# Patient Record
Sex: FEMALE | Race: ASIAN
[De-identification: names, ages, dates, MRNs, and addresses within clinical notes are randomized per-mention and may not be internally consistent; named-entity substitution may affect disease eponyms.]

---

## 2018-07-19 ENCOUNTER — HOSPITAL ENCOUNTER (EMERGENCY)
Dept: HOSPITAL 25 - UCEAST | Age: 43
Discharge: ANOTHER HOSPITAL | End: 2018-07-19
Payer: COMMERCIAL

## 2018-07-19 ENCOUNTER — HOSPITAL ENCOUNTER (INPATIENT)
Dept: HOSPITAL 25 - CHICATH | Age: 43
LOS: 1 days | Discharge: HOME | DRG: 192 | End: 2018-07-20
Attending: INTERNAL MEDICINE | Admitting: INTERNAL MEDICINE
Payer: COMMERCIAL

## 2018-07-19 VITALS — DIASTOLIC BLOOD PRESSURE: 56 MMHG | SYSTOLIC BLOOD PRESSURE: 97 MMHG

## 2018-07-19 DIAGNOSIS — Z79.82: ICD-10-CM

## 2018-07-19 DIAGNOSIS — F32.9: ICD-10-CM

## 2018-07-19 DIAGNOSIS — Z83.3: ICD-10-CM

## 2018-07-19 DIAGNOSIS — F41.9: ICD-10-CM

## 2018-07-19 DIAGNOSIS — I95.9: ICD-10-CM

## 2018-07-19 DIAGNOSIS — R00.0: ICD-10-CM

## 2018-07-19 DIAGNOSIS — M50.321: ICD-10-CM

## 2018-07-19 DIAGNOSIS — Z79.51: ICD-10-CM

## 2018-07-19 DIAGNOSIS — I21.3: Primary | ICD-10-CM

## 2018-07-19 DIAGNOSIS — Z72.89: ICD-10-CM

## 2018-07-19 DIAGNOSIS — I20.1: Primary | ICD-10-CM

## 2018-07-19 DIAGNOSIS — E78.5: ICD-10-CM

## 2018-07-19 DIAGNOSIS — R94.31: ICD-10-CM

## 2018-07-19 DIAGNOSIS — J45.909: ICD-10-CM

## 2018-07-19 DIAGNOSIS — R42: ICD-10-CM

## 2018-07-19 DIAGNOSIS — M19.90: ICD-10-CM

## 2018-07-19 DIAGNOSIS — Z82.49: ICD-10-CM

## 2018-07-19 LAB
AST SERPL-CCNC: 16 U/L (ref 13–39)
BASOPHILS # BLD AUTO: 0.1 10^3/UL (ref 0–0.2)
EOSINOPHIL # BLD AUTO: 0.1 10^3/UL (ref 0–0.6)
HCT VFR BLD AUTO: 37 % (ref 35–47)
HCT VFR BLD AUTO: 45 % (ref 35–47)
HGB BLD-MCNC: 12.7 G/DL (ref 12–16)
HGB BLD-MCNC: 15.1 G/DL (ref 12–16)
INR PPP/BLD: 0.9 (ref 0.77–1.02)
INR PPP/BLD: 0.9 (ref 0.77–1.02)
LYMPHOCYTES # BLD AUTO: 1.8 10^3/UL (ref 1–4.8)
MCH RBC QN AUTO: 30 PG (ref 27–31)
MCH RBC QN AUTO: 30 PG (ref 27–31)
MCHC RBC AUTO-ENTMCNC: 34 G/DL (ref 31–36)
MCHC RBC AUTO-ENTMCNC: 34 G/DL (ref 31–36)
MCV RBC AUTO: 87 FL (ref 80–97)
MCV RBC AUTO: 87 FL (ref 80–97)
MONOCYTES # BLD AUTO: 0.7 10^3/UL (ref 0–0.8)
NEUTROPHILS # BLD AUTO: 9.5 10^3/UL (ref 1.5–7.7)
NRBC # BLD AUTO: 0 10^3/UL
NRBC BLD QL AUTO: 0
PLATELET # BLD AUTO: 228 10^3/UL (ref 150–450)
PLATELET # BLD AUTO: 231 10^3/UL (ref 150–450)
RBC # BLD AUTO: 4.25 10^6/UL (ref 4–5.4)
RBC # BLD AUTO: 5.12 10^6/UL (ref 4–5.4)
WBC # BLD AUTO: 10 10^3/UL (ref 3.5–10.8)
WBC # BLD AUTO: 12.1 10^3/UL (ref 3.5–10.8)

## 2018-07-19 PROCEDURE — 85610 PROTHROMBIN TIME: CPT

## 2018-07-19 PROCEDURE — B2111ZZ FLUOROSCOPY OF MULTIPLE CORONARY ARTERIES USING LOW OSMOLAR CONTRAST: ICD-10-PCS | Performed by: INTERNAL MEDICINE

## 2018-07-19 PROCEDURE — G0463 HOSPITAL OUTPT CLINIC VISIT: HCPCS

## 2018-07-19 PROCEDURE — 82553 CREATINE MB FRACTION: CPT

## 2018-07-19 PROCEDURE — 83605 ASSAY OF LACTIC ACID: CPT

## 2018-07-19 PROCEDURE — 85025 COMPLETE CBC W/AUTO DIFF WBC: CPT

## 2018-07-19 PROCEDURE — 99204 OFFICE O/P NEW MOD 45 MIN: CPT

## 2018-07-19 PROCEDURE — 85730 THROMBOPLASTIN TIME PARTIAL: CPT

## 2018-07-19 PROCEDURE — 83874 ASSAY OF MYOGLOBIN: CPT

## 2018-07-19 PROCEDURE — B2151ZZ FLUOROSCOPY OF LEFT HEART USING LOW OSMOLAR CONTRAST: ICD-10-PCS | Performed by: INTERNAL MEDICINE

## 2018-07-19 PROCEDURE — 93005 ELECTROCARDIOGRAM TRACING: CPT

## 2018-07-19 PROCEDURE — 84484 ASSAY OF TROPONIN QUANT: CPT

## 2018-07-19 PROCEDURE — 99285 EMERGENCY DEPT VISIT HI MDM: CPT

## 2018-07-19 PROCEDURE — 87641 MR-STAPH DNA AMP PROBE: CPT

## 2018-07-19 PROCEDURE — C1887 CATHETER, GUIDING: HCPCS

## 2018-07-19 PROCEDURE — 80053 COMPREHEN METABOLIC PANEL: CPT

## 2018-07-19 PROCEDURE — 80048 BASIC METABOLIC PNL TOTAL CA: CPT

## 2018-07-19 PROCEDURE — 4A023N7 MEASUREMENT OF CARDIAC SAMPLING AND PRESSURE, LEFT HEART, PERCUTANEOUS APPROACH: ICD-10-PCS | Performed by: INTERNAL MEDICINE

## 2018-07-19 PROCEDURE — 85347 COAGULATION TIME ACTIVATED: CPT

## 2018-07-19 PROCEDURE — 83721 ASSAY OF BLOOD LIPOPROTEIN: CPT

## 2018-07-19 PROCEDURE — 80061 LIPID PANEL: CPT

## 2018-07-19 PROCEDURE — 83880 ASSAY OF NATRIURETIC PEPTIDE: CPT

## 2018-07-19 PROCEDURE — 94640 AIRWAY INHALATION TREATMENT: CPT

## 2018-07-19 PROCEDURE — 85027 COMPLETE CBC AUTOMATED: CPT

## 2018-07-19 PROCEDURE — 82550 ASSAY OF CK (CPK): CPT

## 2018-07-19 PROCEDURE — 93306 TTE W/DOPPLER COMPLETE: CPT

## 2018-07-19 PROCEDURE — 36415 COLL VENOUS BLD VENIPUNCTURE: CPT

## 2018-07-19 RX ADMIN — ASPIRIN SCH MG: 81 TABLET, CHEWABLE ORAL at 12:00

## 2018-07-19 NOTE — HP
CC:  Dr. Fozia Penn *

 

ADMISSION HISTORY AND PHYSICAL:

 

DATE OF ADMISSION:  18

 

CHIEF COMPLAINT:  The patient with near syncopal sensation with mild neck 
discomfort and question of slight nauseousness with abnormal EKG raising 
question of acute ST-segment elevation inferior wall myocardial infarction.

 

HISTORY OF PRESENT ILLNESS:  The patient is a 42-year-old female with no 
history of prior cardiac problems other than a history of palpitation several 
years ago that was not found to have any significant abnormalities.  I do not 
have that workup at the time of this dictation, but we will be reviewing that.  
Otherwise, she denies any history of myocardial infarction, congestive heart 
failure, significant heart rhythm disturbance.  Today, she got up and went for 
a ride on her bicycle not doing overly exertional type activity and developed 
the onset of dizziness with a discomfort that she now states within the back of 
the neck.  She stopped because she was not sure if she was going to pass out, 
but thought that she was getting close to passing out.  She sat down, felt a 
little bit nauseous as well.  She denied any profound shortness of breath with 
it.  She called her significant other and they went to [facility] and an 
EKG was obtained that questioned J point elevation in the inferior leads with T 
wave inversion in AVL.  As such, she was transferred over to the main center at 
[facility] as a STEMI alert.  In the emergency room at [facility], she was not having any specific chest or throat discomfort.  En 
route, the ambulance stated that her blood pressure was 56 and they initially 
could not get a line in her.  They eventually got a line in and they gave her 
fluid.  On arrival in the emergency room, her blood pressure was in the 125 
range.

 

The patient had cardiac risk factors include negative history of diabetes. 
Currently, she had gestational diabetes.  No history of hypertension.  She has 
a history of increased cholesterol.  She does not smoke and she has a family 
history of both her father and her mother  of according to her of 
myocardial infarction young in life, father with 61 and mother with 59.  Her 
brother has high cholesterol.

 

Of note, the patient does mention that with riding her bike today, the bike was 
adjusted and she believes she had to bend her neck up a little more.  She also 
noticed some numbness in her hands that before she had attributed to her C4 and 
C5 compression fracture.

 

PAST MEDICAL HISTORY:  The patient states that she has a C4-C5 compression 
fracture that is fairly significant.

 

PAST SURGICAL HISTORY:  Includes fibroid surgery with a myomectomy.

 

ALLERGIES:  She has no known drug allergies.

 

REVIEW OF SYSTEMS:  Pertinent to proceeding to the cardiovascular laboratory.  
She denies any history of stroke, TIA.  She denies any history of hematochezia, 
hematemesis, or hematuria.  She has no significant history of renal dysfunction 
or contrast allergy.

 

                               PHYSICAL EXAMINATION

 

GENERAL:  When I see reveals a pleasant female appearing nervous but in no 
acute distress.

 

VITAL SIGNS:  Blood pressure 125/90, pulse was 79 and regular.

 

HEENT:  Conjunctivae pink.  Sclerae clear.

 

NECK:  Supple with no increased JVP.  Carotid had good upstroke and volume 
without bruits.

 

LUNGS:  Reveal no accessory muscle usage.  There is good excursion.  Lungs were 
clear to A and P.

 

HEART:  Revealed no visible heaves, no palpable heaves or thrills.  Normal S1, 
S2. No S3, S4, gallop.  No significant systolic or diastolic murmur appreciated.

 

ABDOMEN:  Soft, nontender without organomegaly.

 

EXTREMITIES:  Without clubbing, cyanosis, or eve pitting edema.  Peripheral 
pulses were intact.  There was no bruit in the right groin area.

 

NEUROLOGIC:  The patient is alert and oriented with normal mentation.

 

MUSCULOSKELETAL:  The patient moves all extremities appropriately.

 

PSYCHOLOGICAL:  The patient with normal affect.

 

 DIAGNOSTIC STUDIES/LAB DATA:  A limited echocardiogram showed overall normal 
left ventricular contractility with a question of perhaps at most proximal 
portion of the inferior wall in only one review being hypokinetic and perhaps a 
small area of the low posterior lateral wall in only minimal views.  In general
, the overall EF was normal.

 

Repeat EKG was performed and seemed to show less J point elevation.

 

Labs were not sent as no blood had been drawn yet.

 

ASSESSMENT AND PLAN:  Overall assessment, Janny presents now with a dizzy, 
lightheaded sensation with neck discomfort with mild nauseous sensation with an 
abnormal EKG.  Her limited echocardiogram is very subtle in nature.  In general
, comparing this EKG to a prior older EKG, the J point elevation is more 
prominent currently.  Consideration, we discussed at length the issue about 
proceeding with a diagnostic cardiac catheterization to definitively rule out 
the presence of significant underlying coronary artery disease given her strong 
family history and hyperlipidemia and her presentation.  She understood this 
and wished to proceed to get a definitive answer.  The risks and benefits were 
explained to both her and her significant other and she understood them and 
wished to proceed.  The patient received Brilinta therapy as well as a heparin 
bolus of 4000 units and a full aspirin.  Further management will be made 
pending the results of the cardiac catheterization.  Blood tests will be 
obtained at the time of the catheterization.

 

[id number]/[facility] #: [id number]

MTDD

## 2018-07-19 NOTE — UC
Dizzy HPI


HPI Summary: 


I, Holly Markham, scribed for attending Paulette Branch MD. 





Pt is a 43 y/o F who presents to [facility] c/o sudden onset dizziness. Five miles 

into her bike ride this morning she started to feel like crap and felt like 

I was going to black out. Once symptoms began, she got off the bike and laid 

down. Reports that she felt dizzy and is unable to describe the sensation. She 

is unsure if dizziness is changed by closing her eyes. States that at onset her 

neck also became stiff. Additionally c/o slight nausea and numbness/tingling in 

the bilateral hands. Negative CP, sob, abd pain. No nausea,  no diaphoresis, no 

back pain.  Reports that she had a handful of cereal this morning prior to her 

bike ride which is typical and that 5 miles is routine and not 

uncharacteristically far for her. She had not just biked up a hill prior to 

onset of symptoms and she has not had any prior similar episodes. Had a stress 

test many years ago. Pt is on simvastatin.  No anticoagulants.  upon 

arrival. PMHx HLD, depression, seasonal allergies. Negative PMHx HTN, DM, MI, 

CVA. 





- History Of Current Complaint


Stated Complaint: DIZZY


Time Seen by Provider: 07/19/18 07:15


Hx Obtained From: Patient


Hx Last Menstrual Period: 20 days ago.


Onset/Duration: Sudden Onset


Character: Unable To Describe - "like I was going to black out"


Aggravating Factor(s): Nothing


Alleviating Factor(s): Nothing


Associated Signs And Symptoms: Positive: Nausea





- Allergies/Home Medications


Allergies/Adverse Reactions: 


 Allergies











Allergy/AdvReac Type Severity Reaction Status Date / Time


 


No Known Allergies Allergy   Verified 06/19/17 09:26














PMH/Surg Hx/FS Hx/Imm Hx





- Additional Past Medical History


Additional PMH: 


NEGATIVE PMHx: MI, CVA, TIA, DM


Endocrine History: Dyslipidemia


Respiratory History: Other


Other Respiratory History: Seasonal allergies


Psychological History: Depression


Other History Of: 


   Negative For: HIV, Hepatitis B, Hepatitis C, Anticoagulant Therapy





- Surgical History


Surgical History: Yes


Surgery Procedure, Year, and Place: fibroid myomectomy; wisdom teeth





- Family History


Known Family History: Positive: Cardiac Disease, Hypertension, Diabetes





- Social History


Alcohol Use: Occasionally


Alcohol Amount: 1/week


Substance Use Type: None


Smoking Status (MU): Never Smoked Tobacco


Have You Smoked in the Last Year: No





Review of Systems


Constitutional: Negative


Skin: Negative


Eyes: Negative


ENT: Negative


Respiratory: Negative


Cardiovascular: Negative


Gastrointestinal: Nausea


Genitourinary: Negative


Motor: Negative


Neurovascular: Negative


Musculoskeletal: Other: - Neck stiffness


Neurological: Paresthesia - Bilateral hands, Other - Dizziness


Psychological: Negative


All Other Systems Reviewed And Are Negative: Yes





- Comments


Additional Review of Systems Comments: 


NEGATIVE: CP





Physical Exam





- Summary


Physical Exam Summary: 





Vital Signs Reviewed: Yes


A+Ox3, 


Eyes: Conjunctiva Clear, ROBIN. EOM intact and full


ENT: Hearing grossly normal  TM x 2 clear, mmoist, uvula midline, no exudate, 

no erythema


Neck: Positive: Supple


Respiratory: Positive: No respiratory distress, No accessory muscle use + CTA 

throughout  no w/r


Cardiovascular: RRR  nl s1, s2  no m/r  CBT <2  sec


abd soft + BS nt/nd  no guarding, no distension


Musculoskeletal Exam: MANN spontanesously


Neurological: Positive: Alert,  + sensation throughout.  Pt with  adrenaline 

tremors


Psychological: Positive: Normal Response To Family, mild anxious, wife at 

bedside


Skin: Positive: no rash, no ecchymosis


Triage Information Reviewed: Yes





Diagnostics





- EKG


EKG Comments: 


Done at 0711


ST elevations in the inferior leads with reciprocal T wave changes in aVL


Cardiac Rate: NL - 69 bpm


Cardiac Rhythm: Sinus: Normal





Dizzy Course/Dx





- Course


Course Of Treatment: Patient medications reviewed this visit. Critical care 

time 30 minutes.  Pt presents feeling lightheadedness while biking today. Pt 

denies chest pain, shortness of breath, diaphoresis. h/x hld and depression.  

Pt developed tremors in ext x 4 at  - suspect related to adrenaline.  Pt with 

acute ST changes on EKG.  [place] called, STEMI alert .  Pt given 

ASA.  IV placed.  .  1 liter.  pt's wife at bedside - updated and aware.  

pt's wife requested I contact Dr Amador - personal friend - Dr. Vann notified 

as Dr. Amador not on call and office not open





- Differential Dx/Diagnosis


Provider Diagnoses: STEMI.  dizziness





- Physician Notifications


Discussed Patient Care With: 07 - Dr. Cox in ED; STEMI alert at 





Discharge





- Sign-Out/Discharge


Documenting (check all that apply): Patient Departure - Transfer to [facility] ED





- Discharge Plan


Condition: Good


Disposition: TRANS HIGHER LVL OF CARE FAC





- Billing Disposition and Condition


Condition: GOOD


Disposition: Trans Higher Lvl of Care Fac

## 2018-07-20 VITALS — SYSTOLIC BLOOD PRESSURE: 132 MMHG | DIASTOLIC BLOOD PRESSURE: 81 MMHG

## 2018-07-20 LAB
BASOPHILS # BLD AUTO: 0 10^3/UL (ref 0–0.2)
EOSINOPHIL # BLD AUTO: 0.1 10^3/UL (ref 0–0.6)
HCT VFR BLD AUTO: 39 % (ref 35–47)
HGB BLD-MCNC: 13.1 G/DL (ref 12–16)
LYMPHOCYTES # BLD AUTO: 1.8 10^3/UL (ref 1–4.8)
MCH RBC QN AUTO: 29 PG (ref 27–31)
MCHC RBC AUTO-ENTMCNC: 34 G/DL (ref 31–36)
MCV RBC AUTO: 87 FL (ref 80–97)
MONOCYTES # BLD AUTO: 0.6 10^3/UL (ref 0–0.8)
NEUTROPHILS # BLD AUTO: 5.7 10^3/UL (ref 1.5–7.7)
NRBC # BLD AUTO: 0 10^3/UL
NRBC BLD QL AUTO: 0
PLATELET # BLD AUTO: 217 10^3/UL (ref 150–450)
RBC # BLD AUTO: 4.48 10^6/UL (ref 4–5.4)
WBC # BLD AUTO: 8.2 10^3/UL (ref 3.5–10.8)

## 2018-07-20 RX ADMIN — ASPIRIN SCH MG: 81 TABLET, CHEWABLE ORAL at 09:55

## 2018-07-20 NOTE — ECHO
Patient:      TERESA ALMANZA

Med Rec#:     [id number]            :          1975          

Date:         2018            Age:          42y                 

Account#:     [id number]          Height:       162.6 cm / 64.0 in

Accession#:   [id number]           Weight:       78.02 kg / 172.0 lbs

Sex:          F                     BSA:          1.8

Room#:        ICU 2                 

Admit Date#:  2018          

Type:         Inpatient

 

Referring:    Rancho Sena MD

Reading:      Rancho Sena MD

Sonographer:  Nesha Daniels RN RDCS

CC:           JESUS ROBERTS

______________________________________________________________________

 

Transthoracic Echocardiogram

 

Indication:

Abnormal EKG

BP:           131/83

HR:           65

Rhythm:       NSR with PVCs

 

Findings     

History:

HLD 

 

Technical Comments:

The study quality is fair.  The study is technically limited due to

patient body habitus.  

 

Left Ventricle:

The left ventricular chamber size is normal. Mild concentric left

ventricular hypertrophy is observed. Global left ventricular wall motion

and contractility are within normal limits. There is normal left

ventricular systolic function. The estimated ejection fraction is

55-60%.  Normal left ventricular diastolic filling is observed. 

 

Left Atrium:

The left atrial chamber size is normal. 

 

Right Ventricle:

The right ventricular cavity size is normal. The right ventricular

global systolic function is normal. 

 

Right Atrium:

The right atrial cavity size is normal. 

 

Aortic Valve:

The aortic valve is trileaflet. The aortic valve leaflets are mildly

thickened. There is no evidence of aortic regurgitation. There is no

evidence of aortic stenosis. 

 

Mitral Valve:

The mitral valve leaflets are mildly thickened. There is a trace of

mitral regurgitation. There is no evidence of mitral stenosis. 

 

Tricuspid Valve:

The tricuspid valve leaflets are normal.  There is trace tricuspid

regurgitation.  Unable to estimate the right ventricular systolic

pressure.   There is no tricuspid stenosis. 

 

Pulmonic Valve:

The pulmonic valve appears normal. There is mild pulmonic regurgitation.

 There is no pulmonic stenosis.  

 

Pericardium:

There is no significant pericardial effusion. A pericardial fat pad is

visualized. 

 

Aorta:

There is no dilatation of the ascending aorta. There is no dilatation of

the aortic arch. There is no dilation of the aortic root. 

 

Pulmonary Artery:

The main pulmonary artery appears normal. 

 

Venous:

The inferior vena cava appears normal in size. There is less than 50%

respiratory change in the inferior vena cava dimension. 

 

Conclusions

Mild concentric left ventricular hypertrophy is observed.

Global left ventricular wall motion and contractility are within normal

limits.

The estimated ejection fraction is 55-60%. 

Normal left ventricular diastolic filling is observed.

No significant valvular disease:

There is a trace of mitral regurgitation.

There is trace tricuspid regurgitation. 

There is mild pulmonic regurgitation. 

No reports of prior studies are ffered for comparison.

 

Measurements     

Name                    Value         Normal Range            

RVIDd (AP) 2D           2.7 cm        (0.9 - 2.6)             

RVDdMajor (2D)          3.3 cm        (2.2 - 4.4)             

RAd ISD 4CH             4.4 cm        (3.4 - 4.9)             

RA (A4C)W               3.1 cm        (2.9 - 4.6)             

IVSd (2D)               1.1 cm        (0.6 - 1)               

LVPWd (2D)              1.1 cm        (0.6 - 1)               

LVIDd (2D)              4.3 cm        (3.6 - 5.4)             

LVIDs (2D)              2.7 cm        -                        

LV FS (2D)              37 %          (25 - 45)               

Aortic Annulus          1.9 cm        (1.4 - 2.6)             

Ao root diameter (2D)   2.9 cm        (2.1 - 3.5)             

Ascending Ao            2.7 cm        (2.1 - 3.4)             

Aortic arch             2.3 cm        (1.8 - 3.4)             

LA dimension (AP) 2D    3.5 cm        (2.3 - 3.8)             

LAd ISD 4CH             5.1 cm        (2.9 - 5.3)             

LA ISD 4CH W            3.5 cm        (2.5 - 4.5)             

 

Name                    Value         Normal Range            

LA ESV SP 4CH (A/L)     43 ml         -                        

LA ESV SP 2CH (A/L)     34 ml         -                        

LA ESV BP (A/L)         40 ml         -                        

LA ESV BP (A/L) index   21.8 ml/m2    -                        

LA ESV SP 4CH (MOD)     39 ml         -                        

LA ESV SP 2CH (MOD)     32 ml         -                        

 

Name                    Value         Normal Range            

MV E-wave Vmax          0.89 m/sec    -                        

MV deceleration time    139 msec      -                        

MV A-wave Vmax          0.63 m/sec    -                        

MV E:A ratio            1.4 ratio     -                        

LV septal e' Vmax       0.1 m/sec     -                        

LV lateral e' Vmax      0.13 m/sec    -                        

LV E:e' septal ratio    8.9 ratio     -                        

LV E:e' lateral ratio   6.8 ratio     -                        

 

Name                    Value         Normal Range            

AV Vmax                 1.4 m/sec     -                        

AV VTI                  30 cm         -                        

AV peak gradient        8 mmHg        -                        

AV mean gradient        4.8 mmHg      -                        

LVOT Vmax               1.3 m/sec     -                        

LVOT VTI                30 cm         -                        

LVOT peak gradient      6.5 mmHg      -                        

LVOT mean gradient      4.2 mmHg      -                        

ODALYS Vmax                0.87 m/sec    -                        

 

Name                    Value         Normal Range            

IVC diameter            1.9 cm        -                        

 

Name                    Value         Normal Range            

PV Vmax                 0.95 m/sec    -                        

 

Electronically signed by: Rancho Sena MD on 2018 10:11:22

## 2018-07-22 ENCOUNTER — HOSPITAL ENCOUNTER (OUTPATIENT)
Dept: HOSPITAL 25 - ED | Age: 43
Setting detail: OBSERVATION
LOS: 1 days | Discharge: HOME | End: 2018-07-23
Attending: INTERNAL MEDICINE | Admitting: HOSPITALIST
Payer: COMMERCIAL

## 2018-07-22 DIAGNOSIS — R06.02: ICD-10-CM

## 2018-07-22 DIAGNOSIS — Z79.82: ICD-10-CM

## 2018-07-22 DIAGNOSIS — M54.12: ICD-10-CM

## 2018-07-22 DIAGNOSIS — M47.12: ICD-10-CM

## 2018-07-22 DIAGNOSIS — R51: ICD-10-CM

## 2018-07-22 DIAGNOSIS — M50.30: Primary | ICD-10-CM

## 2018-07-22 DIAGNOSIS — R20.0: ICD-10-CM

## 2018-07-22 DIAGNOSIS — R42: ICD-10-CM

## 2018-07-22 LAB
BASOPHILS # BLD AUTO: 0 10^3/UL (ref 0–0.2)
EOSINOPHIL # BLD AUTO: 0.1 10^3/UL (ref 0–0.6)
HCT VFR BLD AUTO: 42 % (ref 35–47)
HGB BLD-MCNC: 14.2 G/DL (ref 12–16)
INR PPP/BLD: 0.84 (ref 0.77–1.02)
LYMPHOCYTES # BLD AUTO: 1.2 10^3/UL (ref 1–4.8)
MCH RBC QN AUTO: 30 PG (ref 27–31)
MCHC RBC AUTO-ENTMCNC: 34 G/DL (ref 31–36)
MCV RBC AUTO: 87 FL (ref 80–97)
MONOCYTES # BLD AUTO: 0.5 10^3/UL (ref 0–0.8)
NEUTROPHILS # BLD AUTO: 3.9 10^3/UL (ref 1.5–7.7)
NRBC # BLD AUTO: 0 10^3/UL
NRBC BLD QL AUTO: 0.1
PLATELET # BLD AUTO: 230 10^3/UL (ref 150–450)
RBC # BLD AUTO: 4.81 10^6/UL (ref 4–5.4)
RBC UR QL AUTO: (no result)
WBC # BLD AUTO: 5.7 10^3/UL (ref 3.5–10.8)
WBC UR QL AUTO: (no result)

## 2018-07-22 PROCEDURE — 70450 CT HEAD/BRAIN W/O DYE: CPT

## 2018-07-22 PROCEDURE — 81003 URINALYSIS AUTO W/O SCOPE: CPT

## 2018-07-22 PROCEDURE — 84484 ASSAY OF TROPONIN QUANT: CPT

## 2018-07-22 PROCEDURE — 72125 CT NECK SPINE W/O DYE: CPT

## 2018-07-22 PROCEDURE — 87086 URINE CULTURE/COLONY COUNT: CPT

## 2018-07-22 PROCEDURE — 80053 COMPREHEN METABOLIC PANEL: CPT

## 2018-07-22 PROCEDURE — 85025 COMPLETE CBC W/AUTO DIFF WBC: CPT

## 2018-07-22 PROCEDURE — 85730 THROMBOPLASTIN TIME PARTIAL: CPT

## 2018-07-22 PROCEDURE — 83605 ASSAY OF LACTIC ACID: CPT

## 2018-07-22 PROCEDURE — 81015 MICROSCOPIC EXAM OF URINE: CPT

## 2018-07-22 PROCEDURE — 85610 PROTHROMBIN TIME: CPT

## 2018-07-22 PROCEDURE — 96374 THER/PROPH/DIAG INJ IV PUSH: CPT

## 2018-07-22 PROCEDURE — 72141 MRI NECK SPINE W/O DYE: CPT

## 2018-07-22 PROCEDURE — 93005 ELECTROCARDIOGRAM TRACING: CPT

## 2018-07-22 PROCEDURE — 36415 COLL VENOUS BLD VENIPUNCTURE: CPT

## 2018-07-22 PROCEDURE — 80048 BASIC METABOLIC PNL TOTAL CA: CPT

## 2018-07-22 PROCEDURE — 99285 EMERGENCY DEPT VISIT HI MDM: CPT

## 2018-07-22 PROCEDURE — G0378 HOSPITAL OBSERVATION PER HR: HCPCS

## 2018-07-22 RX ADMIN — MONTELUKAST SODIUM SCH: 10 TABLET, COATED ORAL at 17:39

## 2018-07-22 RX ADMIN — ATORVASTATIN CALCIUM SCH MG: 20 TABLET, FILM COATED ORAL at 21:49

## 2018-07-22 RX ADMIN — ASPIRIN SCH: 81 TABLET, COATED ORAL at 17:37

## 2018-07-22 RX ADMIN — SERTRALINE HYDROCHLORIDE SCH: 100 TABLET, FILM COATED ORAL at 17:39

## 2018-07-22 RX ADMIN — BUPROPION HYDROCHLORIDE SCH MG: 300 TABLET, FILM COATED, EXTENDED RELEASE ORAL at 18:14

## 2018-07-22 NOTE — CATH
CC:  Dr. Fozia Penn; Dr. Neville Amador

 

CARDIAC CATHETERIZATION REPORT:

 

DATE OF PROCEDURE:  07/19/18

 

INDICATIONS FOR PROCEDURE:  The patient presents with abnormal EKG, raising 
question of acute ST segment inferior elevation, more prominent than on other 
EKG findings with episode of severe dizziness, lightheadedness, and back of 
neck discomfort.

 

PROCEDURE:  Coronary arteriography, left heart catheterization, left 
ventriculography.

 

EQUIPMENT UTILIZED:

1.  Right femoral sheath, an 11 cm 5-Paraguayan sheath.

2.  Diagnostic coronary catheters, a 5-[place] FL4 and 5-[place] FR4 catheter.

3.  Left heart catheterization catheter, a 5-[place] angled pigtail catheter.

4.  Guidewire with 0.035 radial J-tipped guidewire.

 

MEDICATIONS GIVEN:

1.  Versed 1 mg.

2.  Nitroglycerin 100 mcg intracoronary.

 

DESCRIPTION OF PROCEDURE:  The patient was interviewed in the emergency room 
where risks and benefits were explained.  She understood them and wished to 
proceed.  She was brought to the cardiovascular laboratory where a formal time-
out was performed. The right groin area was anesthetized with 1% lidocaine. The 
right femoral artery was cannulated and a sheath was placed.  The patient had 
already received in the emergency room 4000 units of heparin, 325 of aspirin 
and 180 mg of Brilinta. Coronary arteriography was performed followed by left 
heart catheterization, left ventriculography was performed, and a total of 24 
cc of Omnipaque dye at a rate of 12 cc was utilized.  The catheter was then 
pulled back across the aortic valve to recheck gradient.  At the end of the case
, a manual injection was made into the right femoral sheath to assess 
eligibility to utilize the closure device.  It was decided to leave the sheath 
sutured in place, stop the heparin therapy, and pull the sheath when the ACT 
was in the appropriate range.  The patient was stable, transferred to the 
intensive care unit.

 

The total contrast used was 100 cc of Omnipaque dye.  The radiation exposure 
included 7 minutes of fluoro time.  The air kerma radiation was 934 mGy. The 
DAP radiation was 6250 microgray/m2.

 

RESULTS:

 

HEMODYNAMIC DATA:  

   Left heart catheterization revealed central aortic pressure of 126/82 with a 
mean of 103.  Left ventricular pressure 128 over left ventricular end- 
diastolic pressure of 9.

 

LEFT VENTRICULOGRAPHY:  

   Performed in the TERRY projection revealed hyperdynamic left ventricular 
systolic function with an overall ejection fraction in excess of 65%. There was 
no significant mitral regurgitation.

 

CORONARY ARTERIOGRAPHY: 

   A.  Left coronary artery:

      1.  Left main - Somewhat long in nature with no significant obstruction 
seen.

      2.  Left anterior descending artery - The left anterior descending artery 
supplied a moderate size first diagonal branch followed by several thinner 
diagonal branches.  There was no significant obstruction seen throughout the 
course of the vessel.

      3.  Circumflex artery - a nondominant vessel, supplying a somewhat 
smaller caliber bifurcating first obtuse marginal branch, a very thin short 
second obtuse marginal branch, and 3 further low lying obtuse marginal branches 
ending in a bifurcating posterior left ventricular branch.  There was corkscrew 
appearance to all of the vessels raising the question of left ventricular 
hypertrophy.

   B.  Right coronary artery - a dominant vessel supplying the PDA and several 
very thin posterior left ventricular branches; the last of which was very thin 
in nature.  There was a proximal area of spasm that developed with the most 
significant narrowing of 45%.  With intracoronary nitroglycerin, there was 
significant relief of it with a minimal of 20% residual.  As mentioned earlier, 
the most distal portion of the vessel had very thin branches of the posterior 
left ventricular branch.  The right coronary artery PDA and posterior left 
ventricular branches only supplied the proximal to mid inferior wall with a 
wraparound LAD supplying the apex and distal inferior wall.

 

OVERALL ASSESSMENT:  

   No significant obstructive coronary artery disease with catheter-induced 
spasm of the proximal right coronary artery, relieved with intracoronary 
nitroglycerin.  Well-preserved left ventricular systolic function, borderline 
hyperdynamic in nature.  There is no suggestive evidence to account for the 
question of a possible acute ST segment elevation inferior wall myocardial 
infarction.  The patient does have a history of hyperlipidemia and this will be 
followed up with family doctor given the small distal vessels that she has in 
multiple of her arteries.  The decision of whether or not to treat with 
antispasm medications could be raised, although I am not 100% convinced that 
was the primary cause of this episode.

 

 [id number]/[facility] #: [id number]

VICTORIA

## 2018-07-22 NOTE — CONS
CONSULTATION REPORT:

 

DATE OF CONSULT:  18

 

REQUESTING PHYSICIAN:  Dr. Cox.

 

REASON FOR CONSULT:  Spinal cord pathology and cardiac symptoms.

 

HISTORY OF PRESENT ILLNESS:  Janny Wills is a 42-year-old woman previously 
admitted on 18 with symptoms of increased hand numbness, difficulty 
coordinating body movements, lightheadedness in the setting of bike riding and 
increased dorsiflexion of the neck, who was found to have ST elevation on EKG 
and was admitted to hospital for cardiac workup.  This occurred in the setting 
of known cervical disk disease on MRI in 2017 showing multilevel central 
canal narrowing, worse at C4-C5 and C5- C6 and less at C3-C4 and C6-C7 with 
mass effect against the cord (see report for details).

 

In review of Dr. Sena's notes, she had near syncope, mild neck discomfort, 
question of mild nausea and abnormal EKG with ST segment elevation.  This 
resulted in admission on 18 and cardiac catheterization.  Notes from the 
catheterization indicate induced spasm of the right proximal CAD relieved with 
nitroglycerin with no evidence of significant coronary artery disease.  She was 
started on diltiazem; however, she indicates she has not taken this because of 
the potential interaction with simvastatin.

 

In review of symptoms of the patient, she indicates she has had tingling in her 
hands for years and Dr. Castaneda noted increased reflexes, which led to the 
diagnosis of cervical stenosis.  She saw Dr. Monroy, who had suggested surgery
, went on to see Dr. Zaragoza at [facility], and decided to monitor symptoms.

 

This past Thursday, she was on her bike and she indicates that her handlebars 
were pushed forward more than usual, which resulted in her leaning forward and 
dorsiflexing her neck more.  She remembers it was cold that morning and she did 
get some raised bumps on her skin.  She can have some itching at baseline.  She 
noticed while she was riding that the numbness in her hands  started earlier 
and became more intense than it usual.  At baseline, she usually gets numbness 
in bilateral hand digits 3 through 5.  Later in the ride, she got increased 
stiff neck, dizziness and felt kind of drunk and lightheaded with her vision 
off and cloudy.  She got off her bike and went she tried to get back on she 
could not, and called her wife.  She tells me when she was trying to get into 
the car it was "almost unmanageable getting into the car."  She was taken to 
the [facility] and then transferred to [facility] for 
admission.

 

She remembers shaking all over (with no loss of consciousness) at the 
[facility] and in the ambulance, and that improved once she got here to 
the emergency room.  She was discharged from the hospital on Friday night and 
felt fine on Saturday, most of the time spending time on the couch. Her blood 
pressure did range from 130/90 to 155/100.  She started getting a headache in 
the left occipital area last night and felt slightly queasy.  She took some 
Advil, went to bed and she still felt slightly queasy in the morning with 
elevated blood pressure and decided to come into the emergency room.

 

Her wife was with her and notes that her blood pressure was quite low on 
Thursday when she was transported to the hospital.

 

PAST MEDICAL HISTORY:  Includes significant cervical disk disease and spinal 
stenosis as noted above, gestational diabetes, elevated cholesterol, depression
, anxiety, history of fibroid with myomectomy.  She tells me that it has been 
reoccurrence.

 

MEDICATIONS:  Her current medications include:

1.  Montelukast 10 mg p.o. q.h.s.

2.  Loratadine 10 mg p.o. q. day.

3.  Bupropion  mg p.o. q. day.

4.  Sertraline 100 mg p.o. q. day.

5.  Simvastatin 40 mg p.o. q.h.s.

6.  Aspirin 81 mg p.o. q. day.

7.  She had been given a script for diltiazem, which she has not taken.

 

ALLERGIES:  She has no known drug allergies.

 

FAMILY HISTORY:  Includes mother who  of a myocardial infarction at age 59. 
Father  of myocardial infarction at age 61.  Brother with high cholesterol.

 

SOCIAL HISTORY:  She does not smoke.  She does drink occasional alcohol about 1 
a week.  She does not use any drugs such as cocaine or heroin.  There is 
occasional marijuana use.

 

REVIEW OF SYSTEMS:  She denies any chest pain, chest pressure, palpitations, 
shortness of breath.  She denies any change in her thinking, her mood.  There 
has been no incontinence or retention of bowel or bladder.  Numbness is as 
mentioned above.  No clear weakness other than difficulty managing getting into 
car after the episode occurred.  There have been no high fevers for unknown 
reason.  She can have occasional night sweats depending on time of menstrual 
cycle in the setting of being perimenopausal.  Psychiatric history is as 
mentioned above.  Dermatologic history is as mentioned above.  Remainder of 10 
review of systems was negative.

 

PHYSICAL EXAM:  Her most recent vitals include a blood pressure of 133/88, her 
pulse is regular at 77, respiratory rate 14, saturation is 99%.  She had a 
regular cardiac rhythm.  Her lungs were clear to auscultation.  There was no 
evidence of peripheral edema.  Her peripheral pulses were intact.  No rash was 
noted.  She was awake, alert, articulate.  Had normal language function, 
adequate fund of knowledge.  Her pupils were equal and responsive to light.  
Her fundi were flat. She had full extraocular movements with no nystagmus, full 
fields to confrontation. Her facial expression, sensation and hearing were 
equal.  Palate was upgoing. Tongue was midline.  Sternocleidomastoid and 
trapezius were 5/5 in strength.  There was normal bulk and tone with no 
pronator drift.  She gave good strength in the upper and lower extremities.  
Vibration sensation was decreased at the large toes by 15 seconds, ankles by 10 
to 15 seconds, knees by 10 to 15 seconds, and distal interphalangeal joint of 
the second digit of each hand by 10 seconds. Proprioception was intact.  There 
was no asymmetry or length-dependent change in pinprick, cold or light touch 
and no sharp changes on the fingers.  She had normal finger-to-nose and heel-to-
shin movements.  Her reflexes were 3+ in the upper extremities, 3+ at the knees
, 2+ at the ankles, toes were flexor response.  Her Romberg was slightly 
wobbly.  She did a few steps of tandem gait forward and backwards.  She could 
get on to her heels and her toes.

 

DIAGNOSTIC STUDIES/LAB DATA:  Includes review of previous records as summarized 
in the HPI.  CBC which showed a normal white count, hemoglobin and hematocrit, 
and platelets.  She had slight increase in monocytes at 8.4%.  Her metabolic 
panel showed an elevated glucose of 101.  Troponin was 0.  Urinalysis showed 2+ 
esterase, 3+ bacteria, presence of squamous epithelial cells, 2+ blood.

 

Her CT of the brain showed no acute pathology.  This film was reviewed directly.

 

The CT of the cervical spine was read as showing progression when compared to 
the previous CT myelogram in 2014 and I will refer  to the report for further 
details regarding areas of progression.  This film was reviewed directly.  Of 
note, there was noted to be moderate compression of the ventral margin of the 
thecal sac at C3-C4 as well as progression at C5-C6 of the mild compression on 
the ventral thecal sac.

 

IMPRESSION AND PLAN:  A 42-year-old  with known cervical 
disk disease and baseline hand numbness, who had increasing hand numbness, 
difficulty coordinating body movements, stiff neck and dizziness and 
lightheadedness in the setting of bike riding with increased dorsiflexion of 
her neck with findings of an elevated ST segment on EKG.  I am highly 
suspicious of cervical cord compression in the setting of progressive known 
cervical pathology and now worsening with positioning during the bike ride.  
Question is raised on whether this pathology is driving cardiac changes on EKG 
and puts her at risk for cardiac events.  She is going to be admitted to 
telemetry to monitor her cardiac status and blood pressure. We will be 
obtaining an MRI of the cervical spine tomorrow to compare to previous and 
further input will be given accordingly.

 

Dr. Holley will be coming on for the neurohospitalist service and I will ask him 
to take over this case.

 

Education was given to the patient and wife.  All questions were answered.  
Case was discussed with ER attending and with hospitalist.

 

TIME SPENT:  Over two hours of ER time care was provided.

 

 [id number]/[id number] #: [id number]

VICTORIA

## 2018-07-22 NOTE — ED
Complex/Multi-Sys Presentation





- HPI Summary


HPI Summary: 


This is rip Armenta documenting for attending Gigi Cox MD.


This patient is a 42 year old F presenting to ED with a chief complaint of high 

BP since yesterday associated with dizziness and lightheadedness. This patient 

was here in the ED on 07/19/18 with a STEMI; had negative heart cath. She was 

discharged the following evening with BP of about 130/90. She was given 

instructions to f/u with Dr. Amador and has an appt in 2 weeks. Yesterday, she 

measured her BP and it was 150/100. The patient rates the pain 0/10 in 

severity. Symptoms aggravated by moving her head. Symptoms alleviated by 

nothing. Patient reports light-headedness, intermittent L-sided HA, palpitations

, and SOB and tightness in chest this morning when she woke up. Patient denies 

current CP, fever, chills, abdominal pain, and N/V. No PMHx of HTN.





- History Of Current Complaint


Chief Complaint: EDChestPainROMI


Time Seen by Provider: 07/22/18 07:20


Hx Obtained From: Patient


Onset/Duration: Sudden Onset, Lasting Days - since yesterday, Still Present


Timing: Constant - constant dizziness, intermittent HA, Days - since yesterday


Severity Currently: None


Aggravating Factor(s): moving her head aggravates the light-headedness


Alleviating Factor(s): nothing


Associated Signs And Symptoms: Positive: Other - Patient reports high BP, light-

headedness, intermittent L-sided HA, palpitations, and SOB and tightness in 

chest this morning when she woke up. Patient denies current CP, fever, chills, 

abdominal pain, and N/V.





- Allergies/Home Medications


Allergies/Adverse Reactions: 


 Allergies











Allergy/AdvReac Type Severity Reaction Status Date / Time


 


No Known Allergies Allergy   Verified 06/19/17 09:26














PMH/Surg Hx/FS Hx/Imm Hx


Endocrine/Hematology History: 


   Denies: Hx Anticoagulant Therapy, Hx Diabetes, Hx Thyroid Disease


Cardiovascular History: Reports: Hx Hypercholesterolemia


   Denies: Hx Congestive Heart Failure, Hx Deep Vein Thrombosis, Hx Hypertension

, Hx Myocardial Infarction, Hx Pacemaker/ICD


Respiratory History: Reports: Hx Asthma


   Denies: Hx Chronic Obstructive Pulmonary Disease (COPD), Hx Lung Cancer, Hx 

Pneumonia, Hx Pulmonary Embolism


GI History: 


   Denies: Hx Gall Bladder Disease, Hx Gastrointestinal Bleed, Hx Ulcer, Hx 

Urosepsis


 History: 


   Denies: Hx Kidney Stones, Hx Renal Disease


Musculoskeletal History: Reports: Hx Arthritis, Hx Back Problems, Other 

Musculoskeletal History - numbness/tingling


Sensory History: Reports: Hx Contacts or Glasses


   Denies: Hx Hearing Aid


Opthamlomology History: Reports: Hx Contacts or Glasses


Neurological History: Reports: Other Neuro Impairments/Disorders - pain clinic 

patient, C4/C5 compression


   Denies: Hx Dementia, Hx Migraine, Hx Seizures, Hx Transient Ischemic Attacks 

(TIA)


Psychiatric History: Reports: Hx Anxiety, Hx Depression


   Denies: Hx Panic Disorder, Hx Schizophrenia, Hx Bipolar Disorder





- Cancer History


Hx Chemotherapy: No


Hx Radiation Therapy: No





- Surgical History


Surgery Procedure, Year, and Place: fibroid myomectomy; wisdom teeth


Infectious Disease History: No


Infectious Disease History: 


   Denies: Hx Clostridium Difficile, Hx Hepatitis, Hx Human Immunodeficiency 

Virus (HIV), Hx of Known/Suspected MRSA, Hx Shingles, Hx Tuberculosis, Hx Known/

Suspected VRE, Hx Known/Suspected VRSA, History Other Infectious Disease, 

Traveled Outside the [place] in Last 30 Days





- Family History


Known Family History: Positive: Cardiac Disease, Hypertension, Diabetes





- Social History


Alcohol Use: None


Alcohol Amount: 1/week


Substance Use Type: Reports: None


Smoking Status (MU): Never Smoked Tobacco


Have You Smoked in the Last Year: No





Review of Systems


Negative: Fever, Chills


Positive: Palpitations, Other - tightness in chest this morning when she woke up

, high BP.  Negative: Chest Pain - denies current chest pain


Positive: Shortness Of Breath - this morning when she wake up


Negative: Abdominal Pain, Vomiting, Nausea


Neurological: Other - light-headedness


Positive: Headache - intermittent, L-sided


All Other Systems Reviewed And Are Negative: Yes





Physical Exam





- Summary


Physical Exam Summary: 


GENERAL: Patient is a well-developed and nourished FEMALE who is lying 

comfortable in the stretcher. Patient is not in any acute respiratory distress.


HEAD AND FACE: Normocephalic


EYES: PERRLA, EOMI x 2.


EARS: Hearing grossly intact.


MOUTH: Oropharynx within normal limits.


NECK: Supple, trachea is midline, no adenopathy, no JVD, no carotid bruit.


CHEST: Symmetric, no tenderness at palpation


LUNGS: Clear to auscultation bilaterally. No wheezing or crackles.


CVS: Regular rate and rhythm, S1 and S2 present, no murmurs or gallops 

appreciated.


ABDOMEN: Soft, non-tender. Bowel sounds are normal. No abdominal abnormal 

pulsations.


EXTREMITIES: Full ROM in all major joints, no edema, no cyanosis or clubbing.


NEURO: Alert and oriented x 3. No acute neurological deficits. Speech is normal 

and follows commands. CN 2-12 grossly intact


SKIN: Dry and warm


GCS 15


Triage Information Reviewed: Yes


Vital Signs On Initial Exam: 


 Initial Vitals











Resp


 


 94 


 


 07/22/18 06:37











Vital Signs Reviewed: Yes





Diagnostics





- Vital Signs


 Vital Signs











  Temp Pulse Resp BP Pulse Ox


 


 07/22/18 07:08   83  14  129/83  98


 


 07/22/18 07:00   75  22   97


 


 07/22/18 06:52   81  17  145/94  100


 


 07/22/18 06:44   82  14  130/96  99


 


 07/22/18 06:38  97.9 F  82  13  160/89  99


 


 07/22/18 06:37    94  














- Laboratory


Result Diagrams: 


 07/22/18 07:58





 07/22/18 07:58


Lab Statement: Any lab studies that have been ordered have been reviewed, and 

results considered in the medical decision making process.





- CT


  ** C-spine CT


CT Interpretation Completed By: Radiologist - No evidence for traumatic 

cervical spine injury. Progression of degenerative spondylosis compared with 

the 2014 exam with associated worsening acquired central canal and foraminal 

stenosis as described level by level. ED physician has reviewed this radiology 

report.





  ** Brain CT


CT Interpretation Completed By: Radiologist - Negative unenhanced head CT. ED 

physician has reviewed this radiology report.





- EKG


  ** 0649


Cardiac Rate: NL - 86 BPM


EKG Rhythm: Sinus Rhythm


EKG Interpretation: R axis deviation, inverted T wave in anterior leads





  ** 0800


Cardiac Rate: NL - 71 BPM


EKG Rhythm: Sinus Rhythm


EKG Interpretation: Q wave in inferior leads 





Re-Evaluation





- Re-Evaluation


  ** First Eval


Re-Evaluation Time: 10:05


Comment: The patient is still feeling woozy. She knows about severe C-spine 

stenosis with spinal cord compression. She followed up with a neurologist here 

who wanted to do surgery, but got a second opinion in [facility] who said they will 

watch it.





  ** Second Eval


Re-Evaluation Time: 11:43


Comment: Discussed with the patient that Dr. Cornelius will see her in the ED.





Complex Multi-Symp Course/Dx


Assessment/Plan: The patient is a 42 year old F presenting to ED today with a 

chief complaint of high BP since stated with dizziness and lightheadedness 

yesterday.  Patient was brought in as a STEMI alert 2 days ago with subsequent 

cardiac catheter resulted and no intervention.  Patient has a known history of 

severe cervical canal stenosis with spinal cord compression that's being 

followed in [facility].  Her workup including CT of head and neck is 

remarkable for severe C-spine stenosis. Upon re-evaluation, patient was still 

persistently dizzy and so I consulted neurology.  Patient seen and evaluated at 

the bedside by  who recommended admission and thinks the patient's 

symptoms scan because by spinal cord compression and recommended admission for 

MRI.  Discussed with Dr. Almonte who accepts the patient for admission. Patient is 

agreeable with this plan.





- Diagnoses


Differential Diagnoses/HQI/PQRI: Other - dizziness


Provider Diagnoses: 


 Dizziness








- Physician Notifications


Discussed Care Of Patient With: Carolina Cornelius


Time Discussed With Above Provider: 11:41


Instructed by Provider To: Other - Consulted Dr. Cornelius at 1141 who will see 

the patient in the ED. Consulted Dr. Cornelius at 1258 in the ED who recommends 

admisssion for further workup, specifically a C-spine MRI. She thinks it may be 

because of ischemia to the spinal cord. Consulted Dr. Almonte at 1304 who accepts 

the patient for admission.





Discharge





- Sign-Out/Discharge


Documenting (check all that apply): Patient Departure





- Discharge Plan


Condition: Stable


Disposition: ADMITTED TO [facility]


Referrals: 


Fozia Penn MD [Primary Care Provider] - 





- Billing Disposition and Condition


Condition: STABLE


Disposition: Admitted to [facility]
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
## 2018-07-22 NOTE — RAD
INDICATION: Dizziness and paresthesias.



COMPARISON: February 05, 2014 CT myelogram.



TECHNIQUE: Multidetector CT images foramen magnum to lung apices without contrast.

Multiplanar reformation.



REPORT:  Mild kyphosis of the cervical spine increased over the 2014 exam. Negative for

facet subluxation at any level. Negative for cervical vertebral body or posterior element

fracture. Negative for paravertebral hematoma.



Multilevel degenerative spondylosis and less prominent facet joint osteoarthritis. Disc

space narrowing is mild at C2-C3, C3-C4, moderate at C4-C5, moderately severe at C5-C6,

and moderate at C6-C7 with interval worsening.



At C3-C4 dorsal disc osteophyte complex results in moderate impression on the ventral

margin of the thecal sac with interval progression.



At C4-C5 dorsal disc osteophyte complex results in mild impression on the ventral margin

of the thecal sac without significant change. Uncinate process spurring results in

moderate RIGHT and mild LEFT foraminal stenosis with interval worsening.



At C5-C6 dorsal disc osteophyte complex results in mild impression on the ventral margin

of the thecal sac with mild progression. Uncinate process spurring results in moderate

RIGHT and mild LEFT foraminal stenosis without significant change.



At C6-C7 dorsal disc osteophyte complex results in mild impression on the ventral margin

of the thecal sac without significant change. Negative for significant foraminal stenosis.





IMPRESSION: 

#. No evidence for traumatic cervical spine injury. 

#. Progression of degenerative spondylosis compared with the 2014 exam with associated

worsening acquired central canal and foraminal stenosis as described level by level.
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
## 2018-07-22 NOTE — RAD
Indication: Dizziness, paresthesias. Headaches.



Comparison: January 24, 2016



Technique: Noncontrast CT vertex of skull through foramen magnum.



Report: The sulci, ventricles, and basal cisterns are normal for age. Grey matter white

matter differentiation is preserved without evidence for edema. No intra or extra axial

hemorrhage, mass, or fluid collection detected. Unremarkable partially visualized orbital

contents.



Unremarkable calvarium and skull base. Unremarkable scalp.



The visualized paranasal sinuses and mastoid air spaces are clear. 



IMPRESSION: 

#. Negative unenhanced head CT.

## 2018-07-23 VITALS — DIASTOLIC BLOOD PRESSURE: 86 MMHG | SYSTOLIC BLOOD PRESSURE: 144 MMHG

## 2018-07-23 LAB
BASOPHILS # BLD AUTO: 0 10^3/UL (ref 0–0.2)
EOSINOPHIL # BLD AUTO: 0.1 10^3/UL (ref 0–0.6)
HCT VFR BLD AUTO: 42 % (ref 35–47)
HGB BLD-MCNC: 14.4 G/DL (ref 12–16)
LYMPHOCYTES # BLD AUTO: 2 10^3/UL (ref 1–4.8)
MCH RBC QN AUTO: 30 PG (ref 27–31)
MCHC RBC AUTO-ENTMCNC: 34 G/DL (ref 31–36)
MCV RBC AUTO: 87 FL (ref 80–97)
MONOCYTES # BLD AUTO: 0.5 10^3/UL (ref 0–0.8)
NEUTROPHILS # BLD AUTO: 4.2 10^3/UL (ref 1.5–7.7)
NRBC # BLD AUTO: 0 10^3/UL
NRBC BLD QL AUTO: 0.3
PLATELET # BLD AUTO: 241 10^3/UL (ref 150–450)
RBC # BLD AUTO: 4.87 10^6/UL (ref 4–5.4)
WBC # BLD AUTO: 6.8 10^3/UL (ref 3.5–10.8)

## 2018-07-23 RX ADMIN — BUPROPION HYDROCHLORIDE SCH MG: 300 TABLET, FILM COATED, EXTENDED RELEASE ORAL at 08:27

## 2018-07-23 RX ADMIN — ATORVASTATIN CALCIUM SCH MG: 20 TABLET, FILM COATED ORAL at 08:27

## 2018-07-23 RX ADMIN — ASPIRIN SCH MG: 81 TABLET, COATED ORAL at 08:27

## 2018-07-23 RX ADMIN — SERTRALINE HYDROCHLORIDE SCH MG: 100 TABLET, FILM COATED ORAL at 08:27

## 2018-07-23 RX ADMIN — MONTELUKAST SODIUM SCH MG: 10 TABLET, COATED ORAL at 08:27

## 2018-07-23 NOTE — PN
NEUROLOGY PROGRESS REPORT:

 

DATE OF SERVICE:  07/23/18

 

PRIMARY PROVIDER:  EZRA Roger

 

CHIEF COMPLAINT:  Numbness in the hands and lightheadedness.

 

BRIEF HISTORY OF PRESENT ILLNESS:  Ms. Janny Wills is a 42-year-old female 
with history of cervical spondylosis with myelopathy, who presented to [facility] on 07/22/18 with symptoms of increased hand numbness, difficulty 
coordinating body movement, and lightheadedness in the setting of bike riding 
and increased dorsiflexion of the neck.  She was evaluated for a possible ST-
segment elevation on EKG and admitted for the hospital for cardiac workup.  
However, cardiac catheterization, did not reveal any coronary artery disease 
and it was thought that she may have some autonomic instability or/dysfunction 
causing the ST- segment elevation MI that has resolved.  She had a CT of the 
neck that showed multilevel spinal canal narrowing with moderate spinal 
stenosis at C3-C4 due to an osteophyte complex.  There was no evidence of 
traumatic cervical spine injury.  The CT had also showed progression of 
degenerative spondylosis compared with the 2014 examination.

 

The patient is waiting for the MRI study.

 

SUBJECTIVE:  The patient is resting comfortably.  She actually has her regular 
clothes on and is walking around the hallway.  She denied any hand weakness.  
She still has occasional, intermittent hand numbness mostly involving the left 
palm that resolves after shaking the hand or flexing or extending the elbow.  
She denied any neck pain.  She denied any impairment in her bowel or bladder 
functions.  I reviewed the history with her and she denied any neck trauma in 
the past.  She does have a family history of arthritis.  She denied ever 
playing any aggressive sports other than soccer, but she stated that she played 
it "poorly prepared."

 

REVIEW OF SYSTEMS:  She denied any chest pain, shortness of breath, or 
palpitation. She denied any fevers or chills.  She denied any focal weakness or 
paresthesias.

 

MEDICATIONS:

1.  Acetaminophen 650 mg p.o. every 4 hours.

2.  Albuterol 2.5 mg inhaled every 4 hours while awake as needed for wheezing.

3.  Aspirin 81 mg daily.

4.  Atorvastatin 20 mg daily.

5.  Bupropion 300 mg daily.

6.  Milk of magnesia 30 mg p.o. every 4 hours as needed.

7.  Meclizine 25 mg by mouth every 8 hours as needed for dizziness.

8.  Montelukast sodium 10 mg daily.

9.  Sertraline 100 mg daily.

10.  Tramadol 50 mg p.o. every 6 hours as needed for pain.

 

PHYSICAL EXAM:  Vitals:  Temperature 98.4, heart rate 73, respiratory rate 16, 
oxygen saturation 100%, blood pressure 139/78.  General:  A well-nourished, well
- developed, well-fit female, who is in excellent shape.  She is alert and 
cooperative.  Head is normocephalic without obvious abnormalities.  No 
lymphadenopathy was noted.  Spurling sign was negative.  Eyes: Conjunctivae/
corneas are clear with no scleral icterus.  Neck is supple and symmetrical with 
no carotid bruit.  Lungs are clear to auscultation bilaterally and she has 
nonlabored breathing.  Cardiovascular:  Regular rate and rhythm with normal S1, 
S2.  Extremities:  Normal range of motion with no cyanosis.  Skin:  No skin 
lesions or lacerations.  Psych:  Affect is broad and concerned mood.  Easy to 
establish rapport.  Neurological Examination:  Mental status awake, alert and 
oriented to person, place, and time, and general circumstances.  Speech and 
language including expression, naming, repetition, and comprehension were 
assessed and found to be normal.  Cranial Nerves:  Pupils equal, round, and 
reactive to light and extraocular muscles are intact.  There is no facial 
asymmetry.  Tongue is midline and symmetrical bilaterally.  Motor Examination:  
No abnormal movement or pronator drift.  Neck flexion and extension 5/5.  
Shoulder range of motion is full. Shoulder abduction; elbow flexion, elbow 
extension; wrist flexion, wrist extension; finger flexion, extension, abduction
; hip flexion, abduction; knee flexion, extension; ankle dorsiflexion and 
plantar flexion were all checked and are 5/5 bilaterally.  Reflexes right/left:
  Brachioradialis 2/2, biceps 2/2, triceps 2/2, patella 3/2, ankle 2/2, plantar 
mute/mute.  Sensation is intact to light touch. Negative Tinel's sign at the 
wrist over the median nerve.  Normal vibration measuring 15 seconds at the 
distal great toes bilaterally.  Coordination:  Normal finger-to-nose and rapid 
alternating movement.  Gait is narrow based, normal station and gait, no ataxia.

 

LABORATORY DATA:  WBC 6.8, hemoglobin 14.4, hematocrit 42, platelet count of 
241. Sodium, potassium, chloride, and creatinine are all within normal range.

 

ASSESSMENT AND RECOMMENDATION:  Ms. Janny Wills is a 42-year-old female with 
history of cervical spondylosis with myelopathy.

 

Cervical spondylosis with myelopathy - CT of the cervical spine showed 
progression compared to study in 2014.  She does have a large osteophyte 
complex at C3-C4 causing moderate-severe compression of the spinal cord.  I 
also wonder if there is some signaling or syrinx in the cervical spine at that 
region, but this will need to be further evaluated with an MRI of the cervical 
spine without contrast.  She should be going for the study this morning.  The 
patient has scheduled an appointment to see Dr. Medardo Zaragoza, neurosurgeon, at 
[facility] on 08/03/18.  She prefers not to be evaluated by neurosurgery here in-house 
for now.  However, I did inform her that if she does have signaling in the cord 
or syrinx due to osteophytic complex causing a spinal cord compression, I 
advised her to be to seek an opinion by our neurosurgeons here in [facility].  In addition, I provided her with the contact information for 
minimal invasive surgery in [place], [place] by the [facility].  She 
will contact them to establish an appointment.  However, I do not think she 
would be a candidate for minimal invasive surgery due to the multi-level 
involvement.  I did recommend minimizing any activity that may worsen her 
cervical spine disease.  She is at risk of developing quadriplegia, which I did 
mention this to her today.  She should wear a soft cervical collar at all times 
if possible.  The patient appears slightly concerned, but reassurance was 
provided.  In regards to the ST-segment elevation myocardial infarction, I do 
agree that given the lack of coronary artery disease on the angiogram, she may 
have had ST-segment elevation related to activation sympathetic pathway or some 
autonomic hyperstimulation related to the cervical spine problem.

 

TIME SPENT:  I spent a total of 25 minutes and greater than 50% of that was 
spent directly reviewing the medical chart, obtaining history, examining the 
patient, education and counseling, and discussing the possible treatment 
options.  I answered all her questions to the best of my ability.  She would 
like to go home and this could possibly be an option once the MRI is completed 
and depending on the results of the MRI.

 

 [id number] #: [id number]

VICTORIA

## 2018-07-23 NOTE — CONSULT
Consult


Consult: 





Reviewed the MRI C spine results with the patient. She has multi-level 

degenerative disc disease in the cervical spine worst at C3-4.  There has been 

a progression from the 2014 MRI.  There is no signal in the cord.  There is an 

osteophyte causing a mild mass effect and deformity of the anterior portion of 

the the cord. She has an appointment with BETTY in [facility] on August 3rd.  She is not 

interested in obtaining an [place] [facility] [place]  She should wear a soft 

cervical collar when driving or if she is experiencing radiating pain until 

seen by the specialist. She should also no participate in any strenuous 

activity until cleared by the neurosurgeon.  A copy of the CT cervical spine 

and MRI C spine reports were provided.  She is waiting on a copy of the disc.  

Anticipating discharge today.

## 2018-07-23 NOTE — RAD
HISTORY: Neck pain, numbness



COMPARISONS: April 11, 2017, CT dated July 22, 2018



TECHNIQUE: The following sequences were obtained of the cervical spine: Sagittal T1- and

T2-weighted images, sagittal STIR images, axial T2 and gradient echo images.



FINDINGS:



BRAIN AND SPINAL CORD: The visualized spinal cord is normal in caliber, position, and

signal intensity. The visualized portion of the brain is unremarkable. The cerebellar

tonsils are normal in position.

ALIGNMENT: There is straightening with reversal of the normal cervical lordosis.

VERTEBRAL BODIES: There is multilevel anterolateral marginal osteophyte formation.

JOINTS: There is no subluxation or dislocation.

MUSCULATURE: Unremarkable

INTERVERTEBRAL DISCS: There is diffuse loss of intervertebral disc height and T2 signal

throughout the spine.



AXIAL IMAGES:

C2-C3: There is no disc herniation, spinal stenosis, or neuroforaminal narrowing.

C3-C4: There is a broad-based disc osteophyte complex with a calcified central disc

protrusion versus posterior osteophyte measuring 0.4 cm in depth. This is stable from the

previous examinations. There is mild to moderate narrowing of the central canal. There is

mild bilateral neuroforaminal narrowing.

C4-C5: There is a broad-based disc osteophyte complex with bilateral uncovertebral and

facet hypertrophy. There is moderate left and severe right neural foraminal narrowing.

There is moderate narrowing of the central canal.

C5-C6: There is a broad-based disc osteophyte complex with bilateral uncovertebral and

facet hypertrophy. There is a small central posterior osteophyte versus calcified disc

protrusion. There is severe right and moderate left neural foraminal area. There is

moderate narrowing of the central canal.

C6-C7: There is a broad-based disc osteophyte complex with bilateral uncovertebral and

facet hypertrophy. There is moderate bilateral neural foraminal narrowing. There is mild

narrowing of the central canal.

C7-T1: There is no disc herniation, spinal stenosis, or neuroforaminal narrowing.



SOFT TISSUES: The visualized soft tissues of the neck are unremarkable.



OTHER: None.



IMPRESSION:

1.  DEGENERATIVE DISC DISEASE AND OSTEOARTHRITIS.

2.  THERE IS A STABLE CENTRAL POSTERIOR OSTEOPHYTE VERSUS CALCIFIC DISC PROTRUSION AT

C3-C4.

3.  THERE IS MODERATE NARROWING OF THE CENTRAL CANAL AT THE C4-C5 AND C5-C6 AND TO A

LESSER EXTENT AT C3-C4 AND C6-C7.

4.  THERE IS MULTILEVEL NEURAL FORAMINAL NARROWING AS DESCRIBED ABOVE

## 2018-07-25 NOTE — DS
CC:  Dr. Penn *

 

DISCHARGE SUMMARY:

 

DATE OF ADMISSION:  07/22/18

 

DATE OF DISCHARGE:  07/23/18

 

ATTENDING DURING THIS ADMISSION:  Dr. Saige Jaramillo.

 

PRIMARY CARE PROVIDER:  Dr. Fozia Penn.

 

MY ATTENDING:  Dr. Godinez.* (DICTATED BY ERIC ANDRES NP)

 

HOSPITAL COURSE:  This is a 42-year-old female patient, who was recently 
hospitalized for chest pain and had positive troponins.  She underwent a 
cardiac cath which was negative for coronary artery disease.  At that time, it 
was determined she had coronary artery vasospasm which had caused her symptoms 
and was eventually discharged home with outpatient followup.  The patient did 
some aggressive bike riding after her discharge and then started to feel some 
numbness and tingling down the left arm with associated left-sided chest pain.  
The patient came back to the emergency department for evaluation where her 
symptoms seem to be radicular in nature as opposed to true chest pain.  She was 
admitted to observation because her  on the left side was weaker than the 
right.  She was also reporting at that point some dizziness with associated 
weakness.  The patient does have a past medical history of compression fracture 
of C4-C5.  So it was not well understood at that point whether her dizziness 
was caused by her recent NSTEMI versus CVA versus other etiology.  Dr. Cornelius 
from Neurology had evaluated the patient and stated her impression was this was 
aggravation of her compression fracture, possibly some spinal cord compression 
secondary to her chronic cervical disk disease.  She did have an MRI of the C-
spine while she was inpatient and Dr. Cornelius suggested Neurosurgery.  MRI of 
the cervical spine showed advanced degenerative disk disease including loss of 
disk height, central canal stenosis with some broad-based disk osteophyte 
complex and herniations and calcifications of her disks from levels C3 through 
C7.  The patient stated that she wanted to follow up with her neurosurgeon in 
[facility] as opposed to seeing Neurosurgery here in town or at [facility].  The 
patient had a lengthy discussion with Neurology, also with myself explaining 
that her symptoms with the lesser  in her left hand and chronic radicular 
pain and numbness was indicative along with her MRI evaluation of possibility 
of advancing cord compression and we recommended immediate surgical evaluation.
  Again, the patient wished to be discharged to follow up with Dr. Zaragoza, her 
physician at [facility], who specializes in Neurosurgery.  The patient was 
discharged on 07/23/18 and told to follow up with Dr. Zaragoza immediately; however
, the patient states she can only have an appointment on 08/03/18.  Our staff 
will help coordinate expediting a more immediate appointment for neurosurgical 
evaluation for this patient this week.

 

DISCHARGE DIAGNOSES:

1.  Cervical disk disease with left-sided upper extremity radiculopathy.

2.  Recent non-ST-elevation myocardial infarction secondary to coronary 
vasospasm.

3.  History of anxiety and depression.

 

DISCHARGE MEDICATIONS:  Include:

1.  Wellbutrin  mg daily.

2.  Zocor 40 mg daily.

3.  Tramadol 50 mg q. 6 hours as needed.

4.  Zoloft 100 mg daily.

5.  Nitroglycerin 0.4 mg sublingual q. 5 minutes as needed.

6.  Multivitamin one tablet daily.

7.  Singulair 10 mg daily.

8.  Diltiazem 120 mg daily.

9.  Aspirin 81 mg daily.

10.  Ventolin inhaler two puffs q. 4 hours as needed.

 

REVIEW OF SYSTEMS:  On the day of discharge, the patient states she has no 
headache and no dizziness at this time.  She does have her baseline numbness, 
tingling, and weak left-sided  on the left upper extremity which is her 
baseline.  She does have full range of motion.  She has some decreased 
sensation in the fingertips on the affected side.  Cardiovascular:  She is not 
complaining of any chest pain or any shortness of breath.  She has no nausea, 
no vomiting, no abdominal pain, no arthralgias or myalgias and no further 
constitutional complaints.

 

PHYSICAL EXAMINATION:  On the day of discharge, vital signs, blood pressure 144/
86, heart rate 98, temperature 99.2, respiratory rate 20, O2 saturation 99% on 
room air.  HEENT:  The patient is atraumatic, normocephalic.  PERRLA with 
nonicteric sclerae.  Neck is supple.  Nontender.  No JVD noted.  No carotic 
bruit auscultated. No thyromegaly appreciated.  Cardiovascular:  S1, S2 present 
with no murmurs, gallops, or rubs.  Rate and rhythm are regular.  Lungs are 
clear bilaterally to auscultation with no wheezing, rhonchi, or rales.  Abdomen 
is soft, nontender, and nondistended.  Positive bowel sounds in all 4 
quadrants.   was deferred. Musculoskeletal:  There is no clubbing, no cyanosis
, and no edema.  She has +2 distal pulses palpable bipedal.  On the left upper 
extremity, on the affected side, she does have intact sensation, some decreased 
range of motion.  In the upper shoulder and scapular area, she does have a 
weaker  on the left side versus the right, again this has been per her 
report, her baseline.  No other neurologic focalities noted.  Psychiatric:  She 
appears anxious but cooperative and appropriate.

 

DIAGNOSTIC STUDIES/LAB DATA:  WBC 6.8, RBC 4.87, hemoglobin 14.4, hematocrit 42
, platelets 241.  Sodium 139, potassium 4.0, chloride 103, CO2 of 29.  BUN 15, 
creatinine 0.81.  GFR 77.5.  Glucose 104.  Lactic acid 1.3.  Calcium 9.4. 
Troponins are negative at 0.00 and 0.00.  MRI as described above.

 

DISPOSITION:  Agree to discharge the patient with immediate followup with 
Neurosurgery at [facility] with Dr. Zaragoza.  She should also see Dr. Penn, 
her primary care provider in the next 1 to 2 weeks. Follow up with Cardiology 
as needed per her previous discharge.

 

DIET:  She should have a heart-healthy diet.

 

RESTRICTIONS ON ACTIVITY:  The patient was told not to bike, no heavy lifting, 
no strenuous exercise, and no driving until evaluated by Neurosurgery.  The 
patient stated her understanding.  Her wife was also present at the time of 
discharge and was in agreement with the plan and also understood instructions 
noted.  The patient was discharged in stable condition.  All questions were 
answered.

 

____________________________________ ERIC ANDRES NP

 

[id number]/CPS #: [id number]

VI[place]